# Patient Record
Sex: FEMALE | Race: OTHER | HISPANIC OR LATINO | URBAN - METROPOLITAN AREA
[De-identification: names, ages, dates, MRNs, and addresses within clinical notes are randomized per-mention and may not be internally consistent; named-entity substitution may affect disease eponyms.]

---

## 2024-11-18 ENCOUNTER — EMERGENCY (EMERGENCY)
Facility: HOSPITAL | Age: 74
LOS: 1 days | Discharge: HOME | End: 2024-11-18
Attending: EMERGENCY MEDICINE
Payer: COMMERCIAL

## 2024-11-18 VITALS
TEMPERATURE: 98 F | HEIGHT: 60 IN | HEART RATE: 81 BPM | OXYGEN SATURATION: 95 % | WEIGHT: 134.92 LBS | SYSTOLIC BLOOD PRESSURE: 160 MMHG | DIASTOLIC BLOOD PRESSURE: 87 MMHG | RESPIRATION RATE: 19 BRPM

## 2024-11-18 LAB
ALBUMIN SERPL ELPH-MCNC: 4.2 G/DL — SIGNIFICANT CHANGE UP (ref 3.3–5)
ALP SERPL-CCNC: 117 U/L — SIGNIFICANT CHANGE UP (ref 40–120)
ALT FLD-CCNC: 19 U/L — SIGNIFICANT CHANGE UP (ref 10–45)
ANION GAP SERPL CALC-SCNC: 14 MMOL/L — SIGNIFICANT CHANGE UP (ref 5–17)
APTT BLD: 33.8 SEC — SIGNIFICANT CHANGE UP (ref 24.5–35.6)
AST SERPL-CCNC: 31 U/L — SIGNIFICANT CHANGE UP (ref 10–40)
BASOPHILS # BLD AUTO: 0.09 K/UL — SIGNIFICANT CHANGE UP (ref 0–0.2)
BASOPHILS NFR BLD AUTO: 1.2 % — SIGNIFICANT CHANGE UP (ref 0–2)
BILIRUB SERPL-MCNC: 0.2 MG/DL — SIGNIFICANT CHANGE UP (ref 0.2–1.2)
BUN SERPL-MCNC: 10 MG/DL — SIGNIFICANT CHANGE UP (ref 7–23)
CALCIUM SERPL-MCNC: 9.1 MG/DL — SIGNIFICANT CHANGE UP (ref 8.4–10.5)
CHLORIDE SERPL-SCNC: 99 MMOL/L — SIGNIFICANT CHANGE UP (ref 96–108)
CO2 SERPL-SCNC: 21 MMOL/L — LOW (ref 22–31)
CREAT SERPL-MCNC: 0.45 MG/DL — LOW (ref 0.5–1.3)
EGFR: 101 ML/MIN/1.73M2 — SIGNIFICANT CHANGE UP
EOSINOPHIL # BLD AUTO: 0.4 K/UL — SIGNIFICANT CHANGE UP (ref 0–0.5)
EOSINOPHIL NFR BLD AUTO: 5.3 % — SIGNIFICANT CHANGE UP (ref 0–6)
GLUCOSE BLDC GLUCOMTR-MCNC: 103 MG/DL — HIGH (ref 70–99)
GLUCOSE BLDC GLUCOMTR-MCNC: 120 MG/DL — HIGH (ref 70–99)
GLUCOSE SERPL-MCNC: 134 MG/DL — HIGH (ref 70–99)
HCT VFR BLD CALC: 43.9 % — SIGNIFICANT CHANGE UP (ref 34.5–45)
HGB BLD-MCNC: 13.4 G/DL — SIGNIFICANT CHANGE UP (ref 11.5–15.5)
IMM GRANULOCYTES NFR BLD AUTO: 0.4 % — SIGNIFICANT CHANGE UP (ref 0–0.9)
INR BLD: 0.99 RATIO — SIGNIFICANT CHANGE UP (ref 0.85–1.16)
LYMPHOCYTES # BLD AUTO: 2.24 K/UL — SIGNIFICANT CHANGE UP (ref 1–3.3)
LYMPHOCYTES # BLD AUTO: 29.6 % — SIGNIFICANT CHANGE UP (ref 13–44)
MCHC RBC-ENTMCNC: 25.4 PG — LOW (ref 27–34)
MCHC RBC-ENTMCNC: 30.5 G/DL — LOW (ref 32–36)
MCV RBC AUTO: 83.3 FL — SIGNIFICANT CHANGE UP (ref 80–100)
MONOCYTES # BLD AUTO: 0.63 K/UL — SIGNIFICANT CHANGE UP (ref 0–0.9)
MONOCYTES NFR BLD AUTO: 8.3 % — SIGNIFICANT CHANGE UP (ref 2–14)
NEUTROPHILS # BLD AUTO: 4.19 K/UL — SIGNIFICANT CHANGE UP (ref 1.8–7.4)
NEUTROPHILS NFR BLD AUTO: 55.2 % — SIGNIFICANT CHANGE UP (ref 43–77)
NRBC # BLD: 0 /100 WBCS — SIGNIFICANT CHANGE UP (ref 0–0)
PLATELET # BLD AUTO: 264 K/UL — SIGNIFICANT CHANGE UP (ref 150–400)
POTASSIUM SERPL-MCNC: 4.3 MMOL/L — SIGNIFICANT CHANGE UP (ref 3.5–5.3)
POTASSIUM SERPL-SCNC: 4.3 MMOL/L — SIGNIFICANT CHANGE UP (ref 3.5–5.3)
PROT SERPL-MCNC: 8.5 G/DL — HIGH (ref 6–8.3)
PROTHROM AB SERPL-ACNC: 11.3 SEC — SIGNIFICANT CHANGE UP (ref 9.9–13.4)
RBC # BLD: 5.27 M/UL — HIGH (ref 3.8–5.2)
RBC # FLD: 14.2 % — SIGNIFICANT CHANGE UP (ref 10.3–14.5)
SODIUM SERPL-SCNC: 134 MMOL/L — LOW (ref 135–145)
TROPONIN T, HIGH SENSITIVITY RESULT: 7 NG/L — SIGNIFICANT CHANGE UP (ref 0–51)
TROPONIN T, HIGH SENSITIVITY RESULT: 8 NG/L — SIGNIFICANT CHANGE UP (ref 0–51)
TROPONIN T, HIGH SENSITIVITY RESULT: 9 NG/L — SIGNIFICANT CHANGE UP (ref 0–51)
WBC # BLD: 7.58 K/UL — SIGNIFICANT CHANGE UP (ref 3.8–10.5)
WBC # FLD AUTO: 7.58 K/UL — SIGNIFICANT CHANGE UP (ref 3.8–10.5)

## 2024-11-18 PROCEDURE — 99223 1ST HOSP IP/OBS HIGH 75: CPT

## 2024-11-18 PROCEDURE — 71275 CT ANGIOGRAPHY CHEST: CPT | Mod: 26,MC

## 2024-11-18 PROCEDURE — 74174 CTA ABD&PLVS W/CONTRAST: CPT | Mod: 26,MC

## 2024-11-18 PROCEDURE — 71045 X-RAY EXAM CHEST 1 VIEW: CPT | Mod: 26

## 2024-11-18 RX ORDER — ACETAMINOPHEN 500 MG
975 TABLET ORAL ONCE
Refills: 0 | Status: COMPLETED | OUTPATIENT
Start: 2024-11-18 | End: 2024-11-18

## 2024-11-18 RX ORDER — GLUCAGON INJECTION, SOLUTION 1 MG/.2ML
1 INJECTION, SOLUTION SUBCUTANEOUS ONCE
Refills: 0 | Status: ACTIVE | OUTPATIENT
Start: 2024-11-18 | End: 2025-10-17

## 2024-11-18 RX ORDER — INSULIN LISPRO 100/ML
VIAL (ML) SUBCUTANEOUS
Refills: 0 | Status: ACTIVE | OUTPATIENT
Start: 2024-11-18 | End: 2025-10-17

## 2024-11-18 RX ORDER — LIDOCAINE HYDROCHLORIDE 40 MG/ML
1 SOLUTION TOPICAL ONCE
Refills: 0 | Status: COMPLETED | OUTPATIENT
Start: 2024-11-18 | End: 2024-11-18

## 2024-11-18 RX ORDER — ASPIRIN/MAG CARB/ALUMINUM AMIN 325 MG
324 TABLET ORAL ONCE
Refills: 0 | Status: COMPLETED | OUTPATIENT
Start: 2024-11-18 | End: 2024-11-18

## 2024-11-18 RX ORDER — SODIUM CHLORIDE 9 MG/ML
500 INJECTION, SOLUTION INTRAMUSCULAR; INTRAVENOUS; SUBCUTANEOUS ONCE
Refills: 0 | Status: COMPLETED | OUTPATIENT
Start: 2024-11-18 | End: 2024-11-18

## 2024-11-18 RX ORDER — INSULIN LISPRO 100/ML
VIAL (ML) SUBCUTANEOUS AT BEDTIME
Refills: 0 | Status: ACTIVE | OUTPATIENT
Start: 2024-11-18 | End: 2025-10-17

## 2024-11-18 RX ADMIN — Medication 975 MILLIGRAM(S): at 20:22

## 2024-11-18 RX ADMIN — Medication 324 MILLIGRAM(S): at 10:27

## 2024-11-18 RX ADMIN — LIDOCAINE HYDROCHLORIDE 1 PATCH: 40 SOLUTION TOPICAL at 21:33

## 2024-11-18 RX ADMIN — SODIUM CHLORIDE 500 MILLILITER(S): 9 INJECTION, SOLUTION INTRAMUSCULAR; INTRAVENOUS; SUBCUTANEOUS at 23:15

## 2024-11-18 NOTE — ED CDU PROVIDER INITIAL DAY NOTE - DETAILS
73 y/o female with chest pain   - tele   - cards to see  - echo/stress  - discussed with ed team   - will reassess

## 2024-11-18 NOTE — ED ADULT TRIAGE NOTE - NS ED NURSE BANDS TYPE
Quality 130: Documentation Of Current Medications In The Medical Record: Current Medications Documented Quality 431: Preventive Care And Screening: Unhealthy Alcohol Use - Screening: Patient screened for unhealthy alcohol use using a single question and scores less than 2 times per year Quality 265: Biopsy Follow-Up: Biopsy results reviewed, communicated, tracked, and documented Quality 111:Pneumonia Vaccination Status For Older Adults: Pneumococcal Vaccination Previously Received Detail Level: Detailed Quality 226: Preventive Care And Screening: Tobacco Use: Screening And Cessation Intervention: Patient screened for tobacco use and is an ex/non-smoker Quality 110: Preventive Care And Screening: Influenza Immunization: Influenza Immunization previously received during influenza season Name band;

## 2024-11-18 NOTE — ED CDU PROVIDER DISPOSITION NOTE - NSFOLLOWUPINSTRUCTIONS_ED_ALL_ED_FT
1. Follow up with your PCP within 2-3 days.   2. Follow up with cardiology within 2-3 days.   3. Take tylenol as needed for pain.   4. Return to the emergency department if you have worsening pain, difficulty breathing, vomiting or all other concerns. 1. Follow up with your PCP within 2-3 days. Your CT scan shows;     Very small (sub-4 mm) non-calcified right upper lobe nodule.     Please discuss with your doctor.   2. Follow up with cardiology within 2-3 days.   3. Take tylenol as needed for pain.   4. Return to the emergency department if you have worsening pain, difficulty breathing, vomiting or all other concerns. 1. Stay hydrated.  2. Continue any Current Home Medications. Take Tylenol 650mg every 6hrs for pain as needed.  3. Follow up with your PCP in 1-2 days (Bring printed results to your doctor visit).  4. Return if symptoms, worsen, fever, weakness, chest pain, difficulty breathing, dizziness and all other concerns.  Please follow up with Cardiologist Dr. Forde within 1-2 weeks:  Leodan Forde  Cardiology  [address] [place] [id number]  Phone: [phone]      Please follow up the following with your doctors:  high glucose, elevated hgb a1c at 6.7- please follow low carb/sugar diet, eat healthy  Very small (sub-4 mm) non-calcified right upper lobe nodule.   You should also follow up:  aortic plaque, renal cyst, Left buttock subcutaneous calcified nodules, the largest of which measuring 2 x 1.3 cm.    Please also follow up with a Neurologist for left hand weakness. You can follow up with Dr. Avery #[phone] within 1-2 weeks.    Dolor de pecho    LO QUE NECESITA SABER:    El dolor en el pecho puede ser provocado por coribn variedad de condiciones, algunas no tan serias y otras que son de peligro mortal. El dolor de pecho también puede ser un síntoma de un problema digestivo, corrine la acidez o corbin úlcera estomacal. Un ataque de ansiedad o corbin emoción gali, corrine el enojo, también pueden provocar dolor de pecho. Corbin infección, inflamación o fractura en un hueso o cartílago en el pecho podría provocar dolor o molestia. En ocasiones el dolor torácico o la presión en el pecho pueden ser el resultado de fatimah circulación de la misael al corazón (angina). El dolor de pecho también puede ser causado por trastornos potencialmente mortales corrine un ataque al corazón o un coágulo de misael en los pulmones.    INSTRUCCIONES SOBRE EL [facility]:    Llame al número local de emergencias (911 en los [place]) o pídale a alguien que llame si:    Tiene alguno de los siguientes signos de un ataque cardíaco:  Estrujamiento, presión o tensión en barnard pecho    Usted también podría presentar alguno de los siguientes:  Malestar o dolor en barnard espalda, manpreet, mandíbula, abdomen, o brazo    Falta de aliento    Náuseas o vómitos    Desvanecimiento o sudor frío repentino    Regrese a la caryn de emergencias si:    La inflamación en barnard pecho empeora, aun con tratamiento.    Usted tose o vomita misael.    Annabelle heces son negras o tienen misael.    Usted no puede dejar de vomitar o le duele al tragar.  Llame a barnard médico si:    Usted tiene preguntas o inquietudes acerca de barnard condición o cuidado.    Medicamentos:    Los medicamentospueden administrarse para tratar la causa del dolor de pecho. Por ejemplo, analgésicos, medicamentos para la ansiedad o medicamentos para aumentar el flujo de misael al corazón.    No tome ciertos medicamentos sin antes preguntarle a barnard médico.Estos incluyen RADHA, suplementos vitamínicos y hormonas, corrine el estrógeno o la progestina.    [facility] annabelle medicamentos corrine se le haya indicado.Consulte con barnard médico si usted brodie que barnard medicamento no le está ayudando o si presenta efectos secundarios. Infórmele al médico si usted es alérgico a algún medicamento. Mantenga corbin lista actualizada de los medicamentos, las vitaminas y los productos herbales que ivan. Incluya los siguientes datos de los medicamentos: cantidad, frecuencia y motivo de administración. Traiga con usted la lista o los envases de las píldoras a annabelle citas de seguimiento. Lleve la lista de los medicamentos con usted en maggie de corbin emergencia.  Consejos para vivir saludable:Si se conoce la causa de barnard dolor de pecho, barnard médico le dará pautas específicas a seguir. Los siguientes son consejos generales de graeme:    No fume.La nicotina y otros químicos contenidos en los cigarrillos y cigarros pueden causar daño a annabelle pulmones y el corazón. Pida información a barnard médico si usted actualmente fuma y necesita ayuda para dejar de fumar. Los cigarrillos electrónicos o el tabaco sin humo igualmente contienen nicotina. Consulte con barnard médico antes de utilizar estos productos.    Elija corbin variedad de alimentos saludables tan a menudo corrine sea posible.Incluya frutas y verduras frescas, congeladas o enlatadas. También incluya productos lácteos bajos en grasa, pescado, lalo (sin piel) y james magras. Barnard médico o dietista pueden ayudarlo a crear planes de alimentos. Es posible que tenga que evitar ciertos alimentos o bebidas si el dolor es causado por un problema de digestión.  Alimentos saludables      Reduzca el consumo de sodio (sal).Limite el consumo de alimentos altos en sodio, corrine comidas enlatadas, bocadillos salados y embutidos. Si añade nathalie cuando cocina la comida, no añada más en la copeland. Elija alimentos enlatados bajos en sodio tanto corrine sea posible.        Consuma abundante agua al día.El agua ayuda al cuerpo a controlar la temperatura y la presión arterial. Pregunte a barnard médico cuál es la cantidad de agua que debería consumir cada día.    Pregunte acerca de la actividad.Barnard médico le dirá cuáles actividades limitar y cuáles evitar. Pregunte cuándo puede manejar, regresar a barnard trabajo y tener relaciones sexuales. Pida más información acerca de un plan de ejercicio adecuado para usted.    Mantenga un peso saludable.Pregúntele a barnard médico cuál es el peso ideal para usted. Pídale que lo ayude a crear un plan seguro para bajar de peso si tiene sobrepeso.    Pregunte sobre las vacunas que pudiera necesitar.Barnard médico puede indicarle qué vacunas necesita y cuándo aplicárselas. Las siguientes vacunas ayudan a prevenir enfermedades que pueden llegar a ser graves para corbin persona con corbin afección cardíaca:  La vacuna contra la gripe se aplica todos los años.Vacúnese contra la gripe tan pronto corrine se recomiende, normalmente en septiembre u octubre.    La vacuna contra la neumonía generalmente se aplica cada 5 años.Barnard médico puede recomendarle la vacuna contra la neumonía si tiene 65 años o más.    Las vacunas contra la COVID-19 se administran a los adultos en forma de inyección.Se recomienda al menos 1 dosis de corbin vacuna actualizada para todas los adultos. Las vacunas contra la COVID-19 se actualizan a lo kevin del año. Los adultos de 65 años o más necesitan corbin segunda dosis de vacuna actualizada al menos 4 meses después de la primera dosis. Barnard médico puede ayudarle a programar todas las dosis necesarias a medida que se disponga de vacunas actualizadas.  Evite la enfermedad cardíaca  Acuda a corbin consulta de control con barnard médico dentro de las 72 horas, o según le hayan indicaron.Es posible que deba regresar para hacerse más pruebas para encontrar la causa del dolor de pecho. Es probable que lo refieran a un especialista, corrine un cardiólogo o un gastroenterólogo. Anote annabelle preguntas para que se acuerde de hacerlas kelley annabelle visitas.    © [employer] 1973, 2024    	  back to top            © [employer] 1973, 2024

## 2024-11-18 NOTE — ED ADULT NURSE NOTE - ED STAT RN HANDOFF DETAILS
----- Message from Kay Lynn RN sent at 8/15/2022  5:00 PM CDT -----  Contact: [phone]    ----- Message -----  From: Sekou Cavanaugh MA  Sent: 8/15/2022   4:38 PM CDT  To: , #    Mom called          In regards to speak with staff or provider to see about child getting seen as soon as possible. Mom was informed that first available is in October.        Call back  [phone]       report given to RIKI Emery

## 2024-11-18 NOTE — ED CDU PROVIDER INITIAL DAY NOTE - ATTENDING APP SHARED VISIT CONTRIBUTION OF CARE
Attending MD Breaux:   I personally have seen and examined this patient.  Physician assistant note reviewed and agree on plan of care and except where noted.  See below for details.     Seen in [facility], interviewed in [place], accompanied by daughter Nohemi  (Declined  for English speakers in room, requested daughter be used)    74F with PMH/PSH including DM on Metformin, HTN on Olmesartan, s/p C section, s/p breast reduction presents to the ED with L sided back pain, tearing, radiating to chest and upper abdomen, LLE heaviness.  Reports that symptoms started on Thursday 11/14/24, reports back pain is what started first.  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  Reports pain radiated from back to chest and is associated with shortness of breath.  Reports the pain was gradual in onset and worsening as was the shortness of breath.  Reports also feels LLE heaviness but no numbness, reports is ambulatory at her baseline.  Reports pain is not associated with exertion, positional change,  Reports persistent pain despite being at rest.  Denies cough, congestion, runny nose, URI symptoms.  Denies fevers, chills.  Reports mild LUQ tenderness, denies nausea, vomiting, diarrhea, urinary symptoms. Denies bloody or black stools, hematuria, epistaxis, gingival bleeding.  Denies breast pain.  Denies smoking ever, denies ETOH, drug use.      Exam:   General: NAD  HENT: head NCAT, airway patent  Eyes: anicteric, no conjunctival injection   Chest: lungs CTAB with good inspiratory effort, no wheezing, no rhonchi, no rales, +tenderness to L sided palpation of chest,   Cardiac: +S1S2, no obvious m/r/g  GI: abdomen soft with +BS, NT, ND  : no CVAT  MSK: ranging neck and extremities freely  Neuro: moving all extremities spontaneously with 5/5 strength, no saddle anesthesia, nonfocal  Psych: normal mood and affect     A/P: 74F with chest pain, shortness of breath, LLE heaviness, will evaluate for but not limited to aortic dissection, ACS, MSK, will obtain CTA CAP, labs, EKG, tele monitoring, will keep npo.    CDU ADDENDUM: CDU for CTC and Echo, tele monitoring, frequent reassessment

## 2024-11-18 NOTE — ED CDU PROVIDER DISPOSITION NOTE - CLINICAL COURSE
· HPI Objective Statement: 75 yo F with a PMH of DM on Metformin, HTN on Olmesartan, s/p C section, s/p breast reduction p/w L sided back pain, tearing, radiating to chest and upper abdomen x Thursday. Sxs constant since onset and a/w SOB and bilateral leg heaviness, left worse than right. 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 Has never had before. Reports pain radiated from back to left chest when it started. Denies nausea, vomiting, fever, chills, cough, palpitations, leg swelling, diaphoresis, syncope. No hx of CAD or VTE. No VTE RFs. While in ED patient had labs and CTA which were unremarkable. Will place in CDU for tele, echo and stress. Will consult cardiology.    While in CDU ____ · HPI Objective Statement: 73 yo F with a PMH of DM on Metformin, HTN on Olmesartan, s/p C section, s/p breast reduction p/w L sided back pain, tearing, radiating to chest and upper abdomen x Thursday. Sxs constant since onset and a/w SOB and bilateral leg heaviness, left worse than right. 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 Has never had before. Reports pain radiated from back to left chest when it started. Denies nausea, vomiting, fever, chills, cough, palpitations, leg swelling, diaphoresis, syncope. No hx of CAD or VTE. No VTE RFs. While in ED patient had labs and CTA which were unremarkable. Will place in CDU for tele, echo and stress. Will consult cardiology.    While in CDU, pt did well. no events on tele. echo normal. nuc stress normal. cleared by cards. pt to f/up outpatient with pcp, cards, neuro.

## 2024-11-18 NOTE — ED PROVIDER NOTE - PHYSICAL EXAMINATION
CONSTITUTIONAL: Well appearing and in no apparent distress.  ENT: Airway patent, moist mucous membranes.   EYES: Pupils equal, round and reactive to light. EOMI. Conjunctiva normal appearing.   CARDIOVASCULAR: Normal rate, regular rhythm.  Heart sounds S1, S2.  Pulses equal throughout.   RESPIRATORY: Breath sounds clear and equal bilaterally.   GASTROINTESTINAL: Abdomen soft, non-tender, not distended.  MUSCULOSKELETAL: Spine appears normal. Legs symmetric appearing. No calf TTP.   NEUROLOGICAL: Alert and oriented x3, no focal deficits, no motor or sensory deficits. 5/5 muscle strength throughout.  SKIN: Skin normal color, warm, dry and intact.   PSYCHIATRIC: Normal mood and affect.

## 2024-11-18 NOTE — ED CDU PROVIDER DISPOSITION NOTE - PATIENT PORTAL LINK FT
You can access the FollowMyHealth Patient Portal offered by [facility] by registering at the following website: [email or web address]. By joining [facility]’s FollowMyHealth portal, you will also be able to view your health information using other applications (apps) compatible with our system.

## 2024-11-18 NOTE — ED PROVIDER NOTE - ATTENDING APP SHARED VISIT CONTRIBUTION OF CARE
Attending MD Breaux:   I personally have seen and examined this patient.  Physician assistant note reviewed and agree on plan of care and except where noted.  See below for details.     Seen in [facility], interviewed in [place], accompanied by daughter Nohemi    74F with PMH/PSH including DM on Metformin, HTN on Olmesartan, s/p C section, s/p breast reduction presents to the ED with L sided back pain radiating to chest and upper abdomen, LLE heaviness.  Reports that symptoms started on Thursday 11/14/24, reports back pain is what started first.  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  Reports pain radiated from back to chest and is associated with shortness of breath.  Reports the pain was gradual in onset and worsening as was the shortness of breath.  Reports also feels LLE heaviness but no numbness, reports is ambulatory at her baseline.  Reports pain is not associated with exertion, positional change, Attending MD Breaux:   I personally have seen and examined this patient.  Physician assistant note reviewed and agree on plan of care and except where noted.  See below for details.     Seen in [facility], interviewed in [place], accompanied by daughter Nohemi    74F with PMH/PSH including DM on Metformin, HTN on Olmesartan, s/p C section, s/p breast reduction presents to the ED with L sided back pain, tearing, radiating to chest and upper abdomen, LLE heaviness.  Reports that symptoms started on Thursday 11/14/24, reports back pain is what started first.  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  Reports pain radiated from back to chest and is associated with shortness of breath.  Reports the pain was gradual in onset and worsening as was the shortness of breath.  Reports also feels LLE heaviness but no numbness, reports is ambulatory at her baseline.  Reports pain is not associated with exertion, positional change,  Reports persistent pain despite being at rest.  Denies cough, congestion, runny nose, URI symptoms.  Denies fevers, chills.  Reports mild LUQ tenderness, denies nausea, vomiting, diarrhea, urinary symptoms. Denies bloody or black stools, hematuria, epistaxis, gingival bleeding.  Denies breast pain.  Denies smoking ever, denies ETOH, drug use.      Exam:   General: NAD  HENT: head NCAT, airway patent  Eyes: anicteric, no conjunctival injection   Chest: lungs CTAB with good inspiratory effort, no wheezing, no rhonchi, no rales, +tenderness to L sided palpation of chest,   Cardiac: +S1S2, no obvious m/r/g  GI: abdomen soft with +BS, NT, ND  : no CVAT  MSK: ranging neck and extremities freely  Neuro: moving all extremities spontaneously with 5/5 strength, no saddle anesthesia, nonfocal  Psych: normal mood and affect     A/P: 74F with chest pain, shortness of breath, LLE heaviness, will evaluate for but not limited to aortic dissection, ACS, MSK, will obtain CTA CAP, labs, EKG, tele monitoring, will keep npo Attending MD Breaux:   I personally have seen and examined this patient.  Physician assistant note reviewed and agree on plan of care and except where noted.  See below for details.     Seen in [facility], interviewed in [place], accompanied by daughter Nohemi  (Declined  for English speakers in room, requested daughter be used)    74F with PMH/PSH including DM on Metformin, HTN on Olmesartan, s/p C section, s/p breast reduction presents to the ED with L sided back pain, tearing, radiating to chest and upper abdomen, LLE heaviness.  Reports that symptoms started on Thursday 11/14/24, reports back pain is what started first.  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  Reports pain radiated from back to chest and is associated with shortness of breath.  Reports the pain was gradual in onset and worsening as was the shortness of breath.  Reports also feels LLE heaviness but no numbness, reports is ambulatory at her baseline.  Reports pain is not associated with exertion, positional change,  Reports persistent pain despite being at rest.  Denies cough, congestion, runny nose, URI symptoms.  Denies fevers, chills.  Reports mild LUQ tenderness, denies nausea, vomiting, diarrhea, urinary symptoms. Denies bloody or black stools, hematuria, epistaxis, gingival bleeding.  Denies breast pain.  Denies smoking ever, denies ETOH, drug use.      Exam:   General: NAD  HENT: head NCAT, airway patent  Eyes: anicteric, no conjunctival injection   Chest: lungs CTAB with good inspiratory effort, no wheezing, no rhonchi, no rales, +tenderness to L sided palpation of chest,   Cardiac: +S1S2, no obvious m/r/g  GI: abdomen soft with +BS, NT, ND  : no CVAT  MSK: ranging neck and extremities freely  Neuro: moving all extremities spontaneously with 5/5 strength, no saddle anesthesia, nonfocal  Psych: normal mood and affect     A/P: 74F with chest pain, shortness of breath, LLE heaviness, will evaluate for but not limited to aortic dissection, ACS, MSK, will obtain CTA CAP, labs, EKG, tele monitoring, will keep npo

## 2024-11-18 NOTE — ED CDU PROVIDER INITIAL DAY NOTE - OBJECTIVE STATEMENT
75 yo F with a PMH of DM on Metformin, HTN on Olmesartan, s/p C section, s/p breast reduction p/w L sided back pain, tearing, radiating to chest and upper abdomen x Thursday. Sxs constant since onset and a/w SOB and bilateral leg heaviness, left worse than right. 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 Has never had before. Reports pain radiated from back to left chest when it started. Denies nausea, vomiting, fever, chills, cough, palpitations, leg swelling, diaphoresis, syncope. No hx of CAD or VTE. No VTE RFs. While in ED patient had labs and CTA which were unremarkable. Will place in CDU for tele, echo and stress. Will consult cardiology.

## 2024-11-18 NOTE — ED PROVIDER NOTE - OBJECTIVE STATEMENT
75 yo F with a PMH of DM on Metformin, HTN on Olmesartan, s/p C section, s/p breast reduction p/w L sided back pain, tearing, radiating to chest and upper abdomen x Thursday. Sxs constant since onset and a/w SOB and bilateral leg heaviness, left worse than right. 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 Has never had before. Reports pain radiated from back to left chest. 73 yo F with a PMH of DM on Metformin, HTN on Olmesartan, s/p C section, s/p breast reduction p/w L sided back pain, tearing, radiating to chest and upper abdomen x Thursday. Sxs constant since onset and a/w SOB and bilateral leg heaviness, left worse than right. 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 Has never had before. Reports pain radiated from back to left chest when it started. Denies nausea, vomiting, fever, chills, cough, palpitations, leg swelling, diaphoresis, syncope. No hx of CAD or VTE. No VTE RFs.

## 2024-11-18 NOTE — ED ADULT NURSE NOTE - OBJECTIVE STATEMENT
74y F , pmhx DM (does not take meds at this time) presenting from home with left sided CP radiating to abd and left arm. pt states pain is intermittent and is not occurring at this time. NSR onc cardiac monitor - HR in 80s. o2 sat 98% on RA. denies fc nvd urinary/bowel issues no signs of distress at this time.

## 2024-11-18 NOTE — ED CDU PROVIDER DISPOSITION NOTE - CARE PROVIDER_API CALL
Leodan Forde  Cardiology  [address] [place] [address]  Phone: [phone]  Follow Up Time:

## 2024-11-18 NOTE — ED PROVIDER NOTE - PROGRESS NOTE DETAILS
Attending MD Breaux: Daughter updated on labs and CTs, including pulm nodules, colonic diverticulosis, buttock nodule and renal cyst.  Pending repeat trop and EKG.  Amenable with plan.

## 2024-11-19 VITALS
RESPIRATION RATE: 18 BRPM | OXYGEN SATURATION: 97 % | TEMPERATURE: 98 F | HEART RATE: 68 BPM | DIASTOLIC BLOOD PRESSURE: 74 MMHG | SYSTOLIC BLOOD PRESSURE: 159 MMHG

## 2024-11-19 LAB
A1C WITH ESTIMATED AVERAGE GLUCOSE RESULT: 6.7 % — HIGH (ref 4–5.6)
ALBUMIN SERPL ELPH-MCNC: 3.9 G/DL — SIGNIFICANT CHANGE UP (ref 3.3–5)
ALP SERPL-CCNC: 93 U/L — SIGNIFICANT CHANGE UP (ref 40–120)
ALT FLD-CCNC: 14 U/L — SIGNIFICANT CHANGE UP (ref 10–45)
ANION GAP SERPL CALC-SCNC: 13 MMOL/L — SIGNIFICANT CHANGE UP (ref 5–17)
AST SERPL-CCNC: 19 U/L — SIGNIFICANT CHANGE UP (ref 10–40)
BILIRUB SERPL-MCNC: 0.4 MG/DL — SIGNIFICANT CHANGE UP (ref 0.2–1.2)
BUN SERPL-MCNC: 12 MG/DL — SIGNIFICANT CHANGE UP (ref 7–23)
CALCIUM SERPL-MCNC: 9.1 MG/DL — SIGNIFICANT CHANGE UP (ref 8.4–10.5)
CHLORIDE SERPL-SCNC: 102 MMOL/L — SIGNIFICANT CHANGE UP (ref 96–108)
CO2 SERPL-SCNC: 26 MMOL/L — SIGNIFICANT CHANGE UP (ref 22–31)
CREAT SERPL-MCNC: 0.52 MG/DL — SIGNIFICANT CHANGE UP (ref 0.5–1.3)
EGFR: 97 ML/MIN/1.73M2 — SIGNIFICANT CHANGE UP
ESTIMATED AVERAGE GLUCOSE: 146 MG/DL — HIGH (ref 68–114)
GLUCOSE BLDC GLUCOMTR-MCNC: 119 MG/DL — HIGH (ref 70–99)
GLUCOSE SERPL-MCNC: 118 MG/DL — HIGH (ref 70–99)
POTASSIUM SERPL-MCNC: 3.6 MMOL/L — SIGNIFICANT CHANGE UP (ref 3.5–5.3)
POTASSIUM SERPL-SCNC: 3.6 MMOL/L — SIGNIFICANT CHANGE UP (ref 3.5–5.3)
PROT SERPL-MCNC: 7.8 G/DL — SIGNIFICANT CHANGE UP (ref 6–8.3)
SODIUM SERPL-SCNC: 141 MMOL/L — SIGNIFICANT CHANGE UP (ref 135–145)

## 2024-11-19 PROCEDURE — A9500: CPT

## 2024-11-19 PROCEDURE — 85730 THROMBOPLASTIN TIME PARTIAL: CPT

## 2024-11-19 PROCEDURE — 93018 CV STRESS TEST I&R ONLY: CPT | Mod: MC

## 2024-11-19 PROCEDURE — 71045 X-RAY EXAM CHEST 1 VIEW: CPT

## 2024-11-19 PROCEDURE — 70450 CT HEAD/BRAIN W/O DYE: CPT | Mod: 26,MC

## 2024-11-19 PROCEDURE — 86900 BLOOD TYPING SEROLOGIC ABO: CPT

## 2024-11-19 PROCEDURE — 76376 3D RENDER W/INTRP POSTPROCES: CPT

## 2024-11-19 PROCEDURE — G0378: CPT

## 2024-11-19 PROCEDURE — 85025 COMPLETE CBC W/AUTO DIFF WBC: CPT

## 2024-11-19 PROCEDURE — 83036 HEMOGLOBIN GLYCOSYLATED A1C: CPT

## 2024-11-19 PROCEDURE — 80053 COMPREHEN METABOLIC PANEL: CPT

## 2024-11-19 PROCEDURE — 93356 MYOCRD STRAIN IMG SPCKL TRCK: CPT

## 2024-11-19 PROCEDURE — 86850 RBC ANTIBODY SCREEN: CPT

## 2024-11-19 PROCEDURE — 93017 CV STRESS TEST TRACING ONLY: CPT

## 2024-11-19 PROCEDURE — 93016 CV STRESS TEST SUPVJ ONLY: CPT | Mod: MC

## 2024-11-19 PROCEDURE — 76376 3D RENDER W/INTRP POSTPROCES: CPT | Mod: 26

## 2024-11-19 PROCEDURE — 82962 GLUCOSE BLOOD TEST: CPT

## 2024-11-19 PROCEDURE — 93306 TTE W/DOPPLER COMPLETE: CPT | Mod: 26

## 2024-11-19 PROCEDURE — 78452 HT MUSCLE IMAGE SPECT MULT: CPT | Mod: MC

## 2024-11-19 PROCEDURE — 85610 PROTHROMBIN TIME: CPT

## 2024-11-19 PROCEDURE — 99285 EMERGENCY DEPT VISIT HI MDM: CPT | Mod: 25

## 2024-11-19 PROCEDURE — 93005 ELECTROCARDIOGRAM TRACING: CPT | Mod: XU,76

## 2024-11-19 PROCEDURE — 74174 CTA ABD&PLVS W/CONTRAST: CPT | Mod: MC

## 2024-11-19 PROCEDURE — 36000 PLACE NEEDLE IN VEIN: CPT | Mod: XU

## 2024-11-19 PROCEDURE — 71275 CT ANGIOGRAPHY CHEST: CPT | Mod: MC

## 2024-11-19 PROCEDURE — 86901 BLOOD TYPING SEROLOGIC RH(D): CPT

## 2024-11-19 PROCEDURE — 70450 CT HEAD/BRAIN W/O DYE: CPT | Mod: MC

## 2024-11-19 PROCEDURE — 78452 HT MUSCLE IMAGE SPECT MULT: CPT | Mod: 26,MC

## 2024-11-19 PROCEDURE — 93306 TTE W/DOPPLER COMPLETE: CPT

## 2024-11-19 PROCEDURE — 84484 ASSAY OF TROPONIN QUANT: CPT

## 2024-11-19 RX ORDER — ASPIRIN/MAG CARB/ALUMINUM AMIN 325 MG
324 TABLET ORAL ONCE
Refills: 0 | Status: COMPLETED | OUTPATIENT
Start: 2024-11-19 | End: 2024-11-19

## 2024-11-19 RX ADMIN — Medication 324 MILLIGRAM(S): at 06:43

## 2024-11-19 RX ADMIN — Medication 975 MILLIGRAM(S): at 00:33

## 2024-11-19 NOTE — ED CDU PROVIDER SUBSEQUENT DAY NOTE - PROGRESS NOTE DETAILS
CDU NOTE YESSICA Wood: pt resting comfortably, feels well except for very mild L sided chest discomfort under L breast. The discomfort is worse with deep inspiration and certain positioning/movement. no weakness. NAD VSS. no events on tele. chest wall- L side chest wall: no rash, +mild ttp of L side chest wall.   assessed b/l LEs- full 5/5 strength b/l  pt seen by Cards Dr. Forde who recommended CT head, on his exam pt had slight decreased UE strength L side compared to R. CT head ordered. Also continue with plan for nuc stress test. echo done. CDU NOTE YESSICA Wood: TTE- nonactionable. Nuc stress test- normal. I spoke with Dr. Forde, cleared from cardiac standpoint.  CT head negative.  As per Dr. Breaux, pt stable for d/c home.

## 2024-11-19 NOTE — ED ADULT NURSE REASSESSMENT NOTE - NS ED NURSE REASSESS COMMENT FT1
Pt able to safely ambulate to restroom. MD Namita mims
Pt received from RIKI Hicks. Pt requested daughter to translate for her.  Pt oriented to CDU & plan of care was discussed. Pt A&O x 4. Pt in CDU for tele, cards to see, echo, stress. Pt denies any chest pain, SOB, dizziness or palpitations as of now. Pt on a cardiac monitor in NSR, HR in 60's. V/S stable, pt afebrile,  IV in place, patent and free of signs of infiltration. Pt resting in bed. Safety & comfort measures maintained. Call bell in reach. Will continue to monitor.
Pt received from RIKI Cho at 19:00hrs. Pt A&O x 4, German speaking, able to communicate her needs in English. Pt is observed in CDU for chest and upper back pain, pending Echo and stress test. Pt c/o Lt upper back pain, YESSICA Figueroa notified. Tylenol given with no relief. Lidocain path applied to Lt upper back. Pt denies any chest pain, SOB, dizziness or palpitations. Patient on cardiac monitor, NSR, HR in 60's. V/S stable, IV 20G Rt hand, patent and free of signs of infiltration. Pt OOB independently. Safety & comfort measures maintained. Call bell in reach. Will continue to monitor.

## 2024-11-19 NOTE — CONSULT NOTE ADULT - SUBJECTIVE AND OBJECTIVE BOX
Cardiovascular Disease Initial Evaluation  Date of service: 11-19-24 @ 09:47    CHIEF COMPLAINT: Chest pain     HISTORY OF PRESENT ILLNESS:  73 yo F with a PMH of DM on Metformin, HTN on Olmesartan, s/p C section, s/p breast reduction p/w L sided back pain, tearing, radiating to chest and upper abdomen for the past week. Symptoms have been constant since onset and associated with SOB and bilateral leg heaviness, left worse than right. 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  Reports pain radiated from back to left chest when it started. Denies nausea, vomiting, fever, chills, cough, palpitations, leg swelling, diaphoresis, syncope. No hx of CAD or VTE. No VTE RFs. Daughter is at bedside.       Allergies    No Known Allergies    Intolerances    	    MEDICATIONS:            dextrose 50% Injectable 25 Gram(s) IV Push once  dextrose 50% Injectable 12.5 Gram(s) IV Push once  dextrose 50% Injectable 25 Gram(s) IV Push once  dextrose Oral Gel 15 Gram(s) Oral once PRN  glucagon  Injectable 1 milliGRAM(s) IntraMuscular once  insulin lispro (ADMELOG) corrective regimen sliding scale   SubCutaneous three times a day before meals  insulin lispro (ADMELOG) corrective regimen sliding scale   SubCutaneous at bedtime    dextrose 5%. 1000 milliLiter(s) IV Continuous <Continuous>  dextrose 5%. 1000 milliLiter(s) IV Continuous <Continuous>      PAST MEDICAL & SURGICAL HISTORY:  HTN (hypertension)      Diabetes mellitus          FAMILY HISTORY:      SOCIAL HISTORY:    The patient is a nonsmoker       REVIEW OF SYSTEMS:  See HPI, otherwise complete 14 point review of systems negative    [x ] All others negative	  [ ] Unable to obtain    PHYSICAL EXAM:  T(C): 36.7 (11-19-24 @ 07:44), Max: 36.9 (11-18-24 @ 15:31)  HR: 68 (11-19-24 @ 07:44) (64 - 76)  BP: 159/74 (11-19-24 @ 07:44) (140/76 - 172/78)  RR: 18 (11-19-24 @ 07:44) (16 - 18)  SpO2: 97% (11-19-24 @ 07:44) (95% - 98%)  Wt(kg): --  I&O's Summary      Appearance: No Acute Distress; resting comfortably  HEENT:  Normal oral mucosa, PERRL, EOMI	  Cardiovascular: Normal S1 S2, No JVD, No murmurs/rubs/gallops  Respiratory: Normal respiratory effort; Lungs clear to auscultation bilaterally  Gastrointestinal:  Soft, Non-tender, + BS	  Skin: No rashes, No ecchymoses, No cyanosis	  Neurologic: Non-focal; no weakness  Extremities: No clubbing, cyanosis or edema  Vascular: Peripheral pulses palpable 2+ bilaterally  Psychiatry: A & O x 3, Mood & affect appropriate    Laboratory Data:	 	    CBC Full  -  ( 18 Nov 2024 07:23 )  WBC Count : 7.58 K/uL  Hemoglobin : 13.4 g/dL  Hematocrit : 43.9 %  Platelet Count - Automated : 264 K/uL  Mean Cell Volume : 83.3 fl  Mean Cell Hemoglobin : 25.4 pg  Mean Cell Hemoglobin Concentration : 30.5 g/dL  Auto Neutrophil # : 4.19 K/uL  Auto Lymphocyte # : 2.24 K/uL  Auto Monocyte # : 0.63 K/uL  Auto Eosinophil # : 0.40 K/uL  Auto Basophil # : 0.09 K/uL  Auto Neutrophil % : 55.2 %  Auto Lymphocyte % : 29.6 %  Auto Monocyte % : 8.3 %  Auto Eosinophil % : 5.3 %  Auto Basophil % : 1.2 %    11-19    141  |  102  |  12  ----------------------------<  118[H]  3.6   |  26  |  0.52  11-18    134[L]  |  99  |  10  ----------------------------<  134[H]  4.3   |  21[L]  |  0.45[L]    Ca    9.1      19 Nov 2024 05:25  Ca    9.1      18 Nov 2024 07:23    TPro  7.8  /  Alb  3.9  /  TBili  0.4  /  DBili  x   /  AST  19  /  ALT  14  /  AlkPhos  93  11-19  TPro  8.5[H]  /  Alb  4.2  /  TBili  0.2  /  DBili  x   /  AST  31  /  ALT  19  /  AlkPhos  117  11-18      proBNP:   Lipid Profile:   HgA1c:   TSH:       CARDIAC MARKERS:  Trop t 8-9-7            Interpretation of Telemetry: Sinus 	    ECG: Sinus rhythm  RADIOLOGY:  OTHER: 	    PREVIOUS DIAGNOSTIC TESTING:    [x ] Echocardiogram: 11/19   1. Left ventricular cavity is normal in size. Left ventricular systolic function is normal with an ejection fraction of 63 % by 3D. There are no regional wall motion abnormalities seen.   2. Normal right ventricular cavity size and normal right ventricular systolic function.   3. No significant valvular disease.   4. No pericardial effusion seen.   5. No prior echocardiogram is available for comparison.  [ ] Catheterization:  [ ] Stress Test:  	    Assessment:  73 yo F with a PMH of DM on Metformin, HTN on Olmesartan, s/p C section, s/p breast reduction p/w L sided back pain, tearing, radiating to chest and upper abdomen for the past week.     Plan of Care:    #Chest pain   - No evidence of ACS  - EKG shows sinus rhythm  - Echo shows normal function  - Stress test pending  - Further recommendations pending results    #HTN  - BP noted to be elevated  - Continue Olmesartan    #DM  - Patient on Metformin at home, plan to resume upon discharge    #LUE Weakness  - Noted on exam  - Recommend CT head       #ACP (advance care planning)-  Advanced care planning was discussed with the patient and daughter at bedside.   Risks, benefits and alternatives of medical treatment and procedures were discussed in detail and all questions were answered. 30 additional minutes spent addressing advance care plans.      Complex medical decision making in a patient with multiple co-morbidities.   77 minutes spent on total encounter; more than 50% of the visit was spent counseling and/or coordinating care by the attending physician.   	  Leodan Forde DO [facility]  Cardiovascular Diseases  [phone]

## 2024-11-19 NOTE — ED CDU PROVIDER SUBSEQUENT DAY NOTE - ATTENDING APP SHARED VISIT CONTRIBUTION OF CARE
Attending MD Breaux:   I personally have seen and examined this patient.  Physician assistant note reviewed and agree on plan of care and except where noted.  See below for details.     Seen in [facility], interviewed in Bahamian, accompanied by daughter Nohemi  (Declined  for English speakers in room, requested daughter be used)    74F with PMH/PSH including DM on Metformin, HTN on Olmesartan, s/p C section, s/p breast reduction presents to the ED with L sided back pain, tearing, radiating to chest and upper abdomen, LLE heaviness.  Reports that symptoms started on Thursday 11/14/24, reports back pain is what started first.  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  Reports pain radiated from back to chest and is associated with shortness of breath.  Reports the pain was gradual in onset and worsening as was the shortness of breath.  Reports also feels LLE heaviness but no numbness, reports is ambulatory at her baseline.  Reports pain is not associated with exertion, positional change,  Reports persistent pain despite being at rest.  Reports pain improved from arrival, no events overnight.      Exam:   General: NAD  HENT: head NCAT, airway patent   Chest: symmetric chest rise, no increased work of breathing  MSK: ranging neck freely  Neuro: moving all extremities spontaneously, sensory grossly intact, no gross neuro deficits  Psych: normal mood and affect     A/P: 74F with chest pain, shortness of breath, LLE heaviness, now resolved, echo and nuc stress nonactinoable, cleared by cards, stable for dc for outpatient PMD and cards follow up.

## 2024-11-19 NOTE — ED CDU PROVIDER SUBSEQUENT DAY NOTE - HISTORY
CDU PROGRESS NOTE YESSICA HAAS: no interval change. pt resting comfortably, NAD. VSS. pending stress test, TTE in am. Will continue to monitor. CDU PROGRESS NOTE YESSICA HAAS: no interval change. pt resting comfortably, NAD. VSS. pending stress test, TTE in am. Will continue to monitor.  pt daughter Nohemi at bedside providing Hungarian translation

## 2024-11-20 NOTE — ED POST DISCHARGE NOTE - ADDITIONAL DOCUMENTATION
11/20/24 Israel JUAN- received call from daughter, pt needs work note faxed to employer that she can return to work tomorrow 11/21/24. work note generated and faxed over to [phone] at her request.